# Patient Record
Sex: MALE | Race: WHITE | URBAN - METROPOLITAN AREA
[De-identification: names, ages, dates, MRNs, and addresses within clinical notes are randomized per-mention and may not be internally consistent; named-entity substitution may affect disease eponyms.]

---

## 2017-01-06 NOTE — PATIENT DISCUSSION
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 RECOMMENDS OMEGA-3 FISH OIL WITH PRIMARY [facility]ARE PHYSICIANS APPROVAL. RETURN FOR FOLLOW-UP AS SCHEDULED OR SOONER IF SYMPTOMS WORSEN.

## 2017-01-06 NOTE — PATIENT DISCUSSION
Conjunctivitis (Allergic) Counseling. Allergy medications often can help prevent or shorten bouts of allergic conjunctivitis. Sometimes these medications must be started before allergy season or allergy flare-ups begin.

## 2017-01-06 NOTE — PATIENT DISCUSSION
Continue: Bepreve (bepotastine besilate): drops: 1.5% 1 drop twice a day as needed into both eyes 12-

## 2017-03-13 NOTE — PATIENT DISCUSSION
Continue: Refresh Tears (carboxymethylcellulose sodium): drops: 0.5% 1 drop as needed into both eyes

## 2017-03-13 NOTE — PATIENT DISCUSSION
DERMATOCHALASIS / PTOSIS OU:  VISUALLY SIGNIFICANT TO PATIENT. REFER TO OCULOPLASTIC SPECIALIST IF PATIENT DESIRES.

## 2017-05-16 NOTE — PATIENT DISCUSSION
PHOTOGRAPHS: I have reviewed the external ocular photographs of this patient which show the following: Dermatochalasis Upper eyelids OU

## 2017-09-19 NOTE — PATIENT DISCUSSION
DISCUSSED THAT LIMITED BCVA OD IS MULTIFACTORIAL: DUE TO HISTORY OF ERM, DRY EYE SYNDROME, AND CORNEA GUTTATA.

## 2017-09-19 NOTE — PATIENT DISCUSSION
EPIMACULAR MEMBRANE, OU: NOT VISUALLY SIGNIFICANT. SURGERY NOT RECOMMENDED AT THIS TIME. RETURN AS SCHEDULED FOR OCT / EVALUATION.

## 2018-03-28 NOTE — PATIENT DISCUSSION
Epiretinal Membrane Counseling: The diagnosis and natural history of epiretinal membrane was discussed with the patient including the risk of progression with retinal traction resulting in visual distortion. Patient instructed on how to do [address] to check for distortion in vision, The patient is instructed to call back immediately if any vision changes, and keep follow up as scheduled.

## 2018-03-30 NOTE — PATIENT DISCUSSION
RETINA IS ATTACHED OU: S/P PPV/MP OD;  NO HOLES OR TEARS SEEN ON DILATED EXAM TODAY.  RETINAL DETACHMENT SIGNS AND SYMPTOMS REVIEWED

## 2019-04-15 NOTE — PATIENT DISCUSSION
DERMATOCHALASIS / PTOSIS RUL AND LIZBET :  VISUALLY SIGNIFICANT TO PATIENT. SCHEDULE WITH OCULOPLASTIC SPECIALIST IF PATIENT DESIRES.

## 2019-04-15 NOTE — PATIENT DISCUSSION
Epiretinal Membrane Counseling: The diagnosis and natural history of epiretinal membrane was discussed with the patient including the risk of progression with retinal traction resulting in visual distortion. Patient instructed on how to do [address] to check for distortion in vision, The patient is instructed to call back immediately if any vision changes, and keep follow up as scheduled.

## 2019-04-15 NOTE — PATIENT DISCUSSION
EPIRETINAL MEMBRANE, OU:   MINIMAL VISUAL SIGNIFICANCE. RETURN TO RETINA SPECIALIST FOR EVALUATION AND TREATMENT.

## 2019-06-03 NOTE — PATIENT DISCUSSION
Epiretinal Membrane Counseling: The diagnosis and natural history of epiretinal membrane was discussed with the patient including the risk of progression with retinal traction resulting in visual distortion. Patient instructed on how to do [address] to check for distortion in vision, The patient is instructed to call back immediately if any vision changes, and keep follow up as scheduled.

## 2019-06-03 NOTE — PATIENT DISCUSSION
EPIRETINAL MEMBRANE, OU:   VISUALLY SIGNIFICANT. RETURN TO RETINA SPECIALIST IN 2 WEEKS FOR EVALUATION AND TREATMENT.

## 2019-10-22 NOTE — PATIENT DISCUSSION
[place] Visual Field 36 point screen: I have reviewed the visual fields both taped and untaped on this patient which demonstrate significant obstruction of the patient's peripheral visual field on both eyes.

## 2020-07-16 NOTE — PATIENT DISCUSSION
Epiretinal Membrane Counseling: The diagnosis and natural history of epiretinal membrane was discussed with the patient including the risk of progression with retinal traction resulting in visual distortion. Patient instructed on how to do [address] to check for distortion in vision, The patient is instructed to call back immediately if any vision changes, and keep follow up as scheduled.

## 2020-07-16 NOTE — PATIENT DISCUSSION
EYELID PTOSIS, OU:  NO DIPLOPIA OR ANISOCORIA. PT IS HAVING FATIGUING SYMPTOMS. BECOMING  VISUALLY SIGNIFICANT TO PATIENT. REFER TO OCULOPLASTIC SPECIALIST DR. PATEL FOR EVALUATION.

## 2020-07-16 NOTE — PATIENT DISCUSSION
Eyelid Ptosis Counseling: I have discussed my exam findings and the nature of the diagnosis to the patient. There is no evidence of Kirsten syndrome, CN III palsy, eyelid malignancy, or myasthenia gravis. Treatment options include observation, taping the lids up, eyelid crutches attached to glasses, or surgical repair. The patient is instructed to return for follow-up as scheduled or sooner if any change in symptoms.

## 2021-07-19 NOTE — PATIENT DISCUSSION
Epiretinal Membrane Counseling: The diagnosis and natural history of epiretinal membrane was discussed with the patient including the risk of progression with retinal traction resulting in visual distortion. Patient instructed on how to do [address] to check for distortion in vision, The patient is instructed to call back immediately if any vision changes, and keep follow up as scheduled.

## 2021-07-19 NOTE — PATIENT DISCUSSION
POSTERIOR VITREOUS DETACHMENT WITH VITREOUS FLOATERS, OS: S/P PPV OD. NO HOLES OR NO TEARS 360' WITH INDIRECT EXAM, OU. F&amp;F PRECAUTIONS REVIEWED WITH THE PATIENT. RETURN  AS SCHEDULED.

## 2021-07-19 NOTE — PATIENT DISCUSSION
EPIRETINAL MEMBRANE, OS: MINIMAL VISUAL SIGNIFICANCE TO THE PATIENT AT THIS TIME. RECOMMEND PT RETURN TO DR. Clare Hess.

## 2021-09-24 NOTE — PATIENT DISCUSSION
ALLERGIC CONJUNCTIVITIS OU: PRESCRIBE REFRESH GEL. CALL IF NO IMPROVEMENT IN SYMPTOMS. no gum bleeding/no nose bleeding/no skin lumps

## 2022-02-28 ENCOUNTER — NEW PATIENT (OUTPATIENT)
Dept: URBAN - METROPOLITAN AREA CLINIC 39 | Facility: CLINIC | Age: 62
End: 2022-02-28

## 2022-02-28 DIAGNOSIS — H02.832: ICD-10-CM

## 2022-02-28 DIAGNOSIS — H02.834: ICD-10-CM

## 2022-02-28 DIAGNOSIS — H02.831: ICD-10-CM

## 2022-02-28 DIAGNOSIS — H02.835: ICD-10-CM

## 2022-02-28 PROCEDURE — 99203 OFFICE O/P NEW LOW 30 MIN: CPT

## 2022-02-28 PROCEDURE — 92285 EXTERNAL OCULAR PHOTOGRAPHY: CPT

## 2022-02-28 ASSESSMENT — VISUAL ACUITY
OS_CC: 20/20-1
OD_CC: 20/30

## 2022-02-28 NOTE — PATIENT DISCUSSION
Recommend Bilateral lower lid blepharoplasty trans conj laser (discussed risks and benefits of sx. ..) PT not interested at this time.

## 2022-03-02 ENCOUNTER — TECH ONLY (OUTPATIENT)
Dept: URBAN - METROPOLITAN AREA CLINIC 39 | Facility: CLINIC | Age: 62
End: 2022-03-02

## 2022-03-02 DIAGNOSIS — H02.834: ICD-10-CM

## 2022-03-02 DIAGNOSIS — H02.832: ICD-10-CM

## 2022-03-02 DIAGNOSIS — H02.831: ICD-10-CM

## 2022-03-02 DIAGNOSIS — H02.835: ICD-10-CM

## 2022-03-02 PROCEDURE — 92082 INTERMEDIATE VISUAL FIELD XM: CPT

## 2022-03-02 PROCEDURE — 99211T TECH SERVICE

## 2022-03-02 NOTE — PATIENT DISCUSSION
Patient made aware of 24/7 emergency services.
Recommend Bilateral lower lid blepharoplasty trans conj laser (discussed risks and benefits of sx. ..) PT not interested at this time.
Recommend Bilateral upper lid blepharoplasty. predeterm (discussed risks and benefits of sx. .. ).
This visual field clearly demonstrated a minimum of 46% loss of upper field of vision OU, with upper lid skin in repose and elevated by taping of the lid to demonstrate potential correction. This field shows that taping the lids significantly improved this patient's superior field of vision by approximately 44%, OU.
Vf ordered.
pricing for BLL/BUL fat removal was offered ,pt not interested at this time.
no

## 2022-09-07 ENCOUNTER — PRE-OP/H&P (OUTPATIENT)
Dept: URBAN - METROPOLITAN AREA CLINIC 39 | Facility: CLINIC | Age: 62
End: 2022-09-07

## 2022-09-07 DIAGNOSIS — H02.834: ICD-10-CM

## 2022-09-07 DIAGNOSIS — H02.831: ICD-10-CM

## 2022-09-07 PROCEDURE — 99211HP H&P OFFICE/OUTPATIENT VISIT, EST

## 2022-10-03 ENCOUNTER — PRE-OP/H&P (OUTPATIENT)
Dept: URBAN - METROPOLITAN AREA SURGERY 14 | Facility: SURGERY | Age: 62
End: 2022-10-03

## 2022-10-03 ENCOUNTER — SURGERY/PROCEDURE (OUTPATIENT)
Dept: URBAN - METROPOLITAN AREA SURGERY 14 | Facility: SURGERY | Age: 62
End: 2022-10-03

## 2022-10-03 DIAGNOSIS — H02.834: ICD-10-CM

## 2022-10-03 DIAGNOSIS — H02.831: ICD-10-CM

## 2022-10-03 PROCEDURE — 99211T TECH SERVICE

## 2022-10-03 PROCEDURE — 15823 BLEPHARP UPR EYELID XCSV SKN: CPT

## 2022-10-03 NOTE — PROCEDURE NOTE: SURGICAL
"<p><strong>PREOPERATIVE DIAGNOSIS</strong>: <span>&nbsp;&nbsp; </span><span>&nbsp;&nbsp;&nbsp;&nbsp;&nbsp;&nbsp;&nbsp;&nbsp;&nbsp;&nbsp;&nbsp;&nbsp;</span>Dermatochalasis upper lids. </p><p><span>&nbsp;&nbsp;&nbsp;&nbsp;&nbsp;&nbsp;&nbsp;&nbsp;&nbsp;&nbsp;&nbsp;&nbsp;&nbsp;&nbsp;&nbsp;&nbsp;&nbsp;&nbsp;&nbsp;&nbsp;&nbsp;&nbsp;&nbsp;&nbsp;&nbsp;&nbsp;&nbsp;&nbsp;&nbsp;&nbsp;&nbsp;&nbsp;&nbsp;&nbsp;&nbsp;&nbsp;&nbsp;&nbsp;&nbsp;&nbsp;&nbsp;&nbsp;&nbsp;&nbsp;&nbsp;&nbsp;&nbsp;&nbsp;&nbsp;&nbsp;&nbsp;&nbsp;&nbsp;&nbsp;&nbsp;&nbsp;&nbsp;&nbsp;&nbsp;&nbsp;&nbsp;&nbsp;&nbsp;&nbsp;&nbsp;&nbsp;&nbsp;&nbsp;&nbsp;&nbsp; </span></p><p><strong>POSTOPERATIVE DIAGNOSIS</strong>: <span>&nbsp;&nbsp;&nbsp;&nbsp;&nbsp;&nbsp;&nbsp;&nbsp;&nbsp;&nbsp;&nbsp; </span>Same.</p><p>&nbsp;</p><p style=""margin-right:-13.5pt;tab-stops:-1.0in;""><strong>PROCEDURE:</strong> <span>&nbsp;&nbsp;&nbsp;&nbsp;&nbsp;&nbsp;&nbsp;&nbsp;&nbsp;&nbsp;&nbsp;&nbsp;&nbsp;&nbsp;&nbsp;&nbsp;&nbsp;&nbsp;&nbsp;&nbsp;&nbsp;&nbsp;&nbsp;&nbsp;&nbsp;&nbsp;&nbsp;&nbsp;&nbsp;&nbsp;&nbsp;&nbsp;&nbsp;&nbsp;&nbsp;&nbsp;&nbsp;&nbsp;&nbsp;&nbsp;&nbsp;&nbsp;&nbsp; </span>Upper Lid Blepharoplasty Skin Removal Both Eyes. </p><p style=""margin-right:-13.5pt;tab-stops:-1.0in;""><strong><span>&nbsp;&nbsp;&nbsp;&nbsp;&nbsp;&nbsp;&nbsp;&nbsp;&nbsp;&nbsp;&nbsp;&nbsp;&nbsp;&nbsp;&nbsp;&nbsp;&nbsp;&nbsp;&nbsp;&nbsp;&nbsp;&nbsp;&nbsp;&nbsp;&nbsp;&nbsp;&nbsp;&nbsp;&nbsp;&nbsp;&nbsp;&nbsp;&nbsp;&nbsp;&nbsp;&nbsp;&nbsp;&nbsp;&nbsp;&nbsp;&nbsp;&nbsp;&nbsp;&nbsp;&nbsp;&nbsp;&nbsp;&nbsp;&nbsp;&nbsp;&nbsp;&nbsp;&nbsp;&nbsp;&nbsp;&nbsp;&nbsp;&nbsp;&nbsp;&nbsp;&nbsp;&nbsp;&nbsp;&nbsp;&nbsp;&nbsp;&nbsp;&nbsp;&nbsp;&nbsp;&nbsp; </span></strong></p><p><strong>SURGEON:</strong> <span>&nbsp;&nbsp;&nbsp;&nbsp;&nbsp;&nbsp;&nbsp;&nbsp;&nbsp;&nbsp;&nbsp;&nbsp;&nbsp;&nbsp;&nbsp;&nbsp;&nbsp;&nbsp;&nbsp;&nbsp;&nbsp;&nbsp;&nbsp;&nbsp;&nbsp;&nbsp;&nbsp;&nbsp;&nbsp;&nbsp;&nbsp;&nbsp;&nbsp;&nbsp;&nbsp;&nbsp;&nbsp; </span><span>&nbsp;&nbsp;</span><span>&nbsp;&nbsp;&nbsp;&nbsp;&nbsp;&nbsp;&nbsp;&nbsp;&nbsp; </span>Carlyle Watson

## 2022-10-10 ENCOUNTER — POST-OP (OUTPATIENT)
Dept: URBAN - METROPOLITAN AREA CLINIC 39 | Facility: CLINIC | Age: 62
End: 2022-10-10

## 2022-10-10 DIAGNOSIS — H02.835: ICD-10-CM

## 2022-10-10 DIAGNOSIS — H02.832: ICD-10-CM

## 2022-10-10 DIAGNOSIS — Z98.890: ICD-10-CM

## 2022-10-10 PROCEDURE — 92285 EXTERNAL OCULAR PHOTOGRAPHY: CPT

## 2022-10-10 PROCEDURE — 99024 POSTOP FOLLOW-UP VISIT: CPT

## 2022-10-10 ASSESSMENT — VISUAL ACUITY
OD_CC: 20/30-1
OS_CC: 20/50-1

## 2022-10-10 NOTE — PATIENT DISCUSSION
One week post op: great curve and shape, no infection, healing well, sutures intact and removed, use Arnica ointment BID  to upper eyelids x 10 days. Use Vitamin E oil/Skinuva QHS x 1 month to incisions after 10 days. Wear sunglasses and hat while outdoors. Avoid sun exposure. No restrictions in 3 days.

## 2022-11-14 NOTE — PATIENT DISCUSSION
Educated patient on findings, condition, and affect on vision. Prescribe artificial tears BID/prn OU and warm compresses QD/prn OU. Discussed good visual hygiene including conscious blinking. Monitor.

## 2022-11-14 NOTE — PATIENT DISCUSSION
Educated patient in detail on findings. Discussed ocular health stable to last visit. No ocular etiology to explain patient symptoms of dizziness. Discussed importance of wearing SRx full time (instead of just TV and reading) for best adaptation/comfort. Monitor.